# Patient Record
Sex: FEMALE | Race: WHITE | NOT HISPANIC OR LATINO
[De-identification: names, ages, dates, MRNs, and addresses within clinical notes are randomized per-mention and may not be internally consistent; named-entity substitution may affect disease eponyms.]

---

## 2023-02-15 ENCOUNTER — DASHBOARD ENCOUNTERS (OUTPATIENT)
Age: 53
End: 2023-02-15

## 2023-02-15 ENCOUNTER — OFFICE VISIT (OUTPATIENT)
Dept: URBAN - METROPOLITAN AREA CLINIC 60 | Facility: CLINIC | Age: 53
End: 2023-02-15
Payer: COMMERCIAL

## 2023-02-15 ENCOUNTER — WEB ENCOUNTER (OUTPATIENT)
Dept: URBAN - METROPOLITAN AREA CLINIC 60 | Facility: CLINIC | Age: 53
End: 2023-02-15

## 2023-02-15 VITALS
WEIGHT: 117 LBS | OXYGEN SATURATION: 98 % | BODY MASS INDEX: 18.8 KG/M2 | HEART RATE: 69 BPM | HEIGHT: 66 IN | TEMPERATURE: 98.2 F | SYSTOLIC BLOOD PRESSURE: 102 MMHG | DIASTOLIC BLOOD PRESSURE: 66 MMHG

## 2023-02-15 DIAGNOSIS — Z12.11 COLON CANCER SCREENING: ICD-10-CM

## 2023-02-15 PROCEDURE — 99202 OFFICE O/P NEW SF 15 MIN: CPT | Performed by: INTERNAL MEDICINE

## 2023-02-15 RX ORDER — LEVOTHYROXINE SODIUM 88 UG/1
1 TABLET IN THE MORNING ON AN EMPTY STOMACH TABLET ORAL ONCE A DAY
Status: ACTIVE | COMMUNITY

## 2023-02-15 RX ORDER — POLYETHYLENE GLYCOL 3350, SODIUM CHLORIDE, SODIUM BICARBONATE, POTASSIUM CHLORIDE 420; 11.2; 5.72; 1.48 G/4L; G/4L; G/4L; G/4L
AS DIRECTED POWDER, FOR SOLUTION ORAL ONCE
Qty: 4000 | Refills: 0 | OUTPATIENT
Start: 2023-02-15 | End: 2023-02-16

## 2023-02-15 NOTE — HPI-TODAY'S VISIT:
This a 52-year-old female coming in for screening colonoscopy.  No family history of colon cancer.  No underlying GI issues.  No underlying cardiac or pulmonary issues.  No history of colon polyps.

## 2023-02-22 ENCOUNTER — LAB OUTSIDE AN ENCOUNTER (OUTPATIENT)
Dept: URBAN - METROPOLITAN AREA CLINIC 60 | Facility: CLINIC | Age: 53
End: 2023-02-22

## 2023-03-21 ENCOUNTER — OFFICE VISIT (OUTPATIENT)
Dept: URBAN - METROPOLITAN AREA SURGERY CENTER 4 | Facility: SURGERY CENTER | Age: 53
End: 2023-03-21
Payer: COMMERCIAL

## 2023-03-21 DIAGNOSIS — Z12.11 COLON CANCER SCREENING: ICD-10-CM

## 2023-03-21 DIAGNOSIS — K64.0 GRADE I INTERNAL HEMORRHOIDS: ICD-10-CM

## 2023-03-21 DIAGNOSIS — K64.1 GRADE II INTERNAL HEMORRHOIDS: ICD-10-CM

## 2023-03-21 PROCEDURE — G0121 COLON CA SCRN NOT HI RSK IND: HCPCS | Performed by: INTERNAL MEDICINE

## 2023-03-21 RX ORDER — LEVOTHYROXINE SODIUM 88 UG/1
1 TABLET IN THE MORNING ON AN EMPTY STOMACH TABLET ORAL ONCE A DAY
Status: ACTIVE | COMMUNITY